# Patient Record
Sex: FEMALE | Race: WHITE
[De-identification: names, ages, dates, MRNs, and addresses within clinical notes are randomized per-mention and may not be internally consistent; named-entity substitution may affect disease eponyms.]

---

## 2018-08-09 ENCOUNTER — HOSPITAL ENCOUNTER (OUTPATIENT)
Dept: HOSPITAL 31 - C.ENDO | Age: 63
Discharge: HOME | End: 2018-08-09
Attending: INTERNAL MEDICINE
Payer: COMMERCIAL

## 2018-08-09 VITALS — RESPIRATION RATE: 18 BRPM | HEART RATE: 65 BPM | SYSTOLIC BLOOD PRESSURE: 112 MMHG | DIASTOLIC BLOOD PRESSURE: 58 MMHG

## 2018-08-09 VITALS — OXYGEN SATURATION: 96 %

## 2018-08-09 VITALS — TEMPERATURE: 97.1 F

## 2018-08-09 DIAGNOSIS — E11.9: ICD-10-CM

## 2018-08-09 DIAGNOSIS — K29.80: ICD-10-CM

## 2018-08-09 DIAGNOSIS — Z79.899: ICD-10-CM

## 2018-08-09 DIAGNOSIS — Z79.84: ICD-10-CM

## 2018-08-09 DIAGNOSIS — K64.0: ICD-10-CM

## 2018-08-09 DIAGNOSIS — Z12.11: Primary | ICD-10-CM

## 2018-08-09 DIAGNOSIS — K29.50: ICD-10-CM

## 2018-08-09 DIAGNOSIS — I10: ICD-10-CM

## 2018-08-09 DIAGNOSIS — Z79.82: ICD-10-CM

## 2018-08-09 DIAGNOSIS — R10.13: ICD-10-CM

## 2018-08-09 PROCEDURE — 43239 EGD BIOPSY SINGLE/MULTIPLE: CPT

## 2018-08-09 PROCEDURE — 88342 IMHCHEM/IMCYTCHM 1ST ANTB: CPT

## 2018-08-09 PROCEDURE — 88305 TISSUE EXAM BY PATHOLOGIST: CPT

## 2018-08-09 PROCEDURE — 88313 SPECIAL STAINS GROUP 2: CPT

## 2018-08-09 PROCEDURE — 45378 DIAGNOSTIC COLONOSCOPY: CPT

## 2018-08-09 PROCEDURE — 82948 REAGENT STRIP/BLOOD GLUCOSE: CPT

## 2019-03-28 ENCOUNTER — HOSPITAL ENCOUNTER (OUTPATIENT)
Dept: HOSPITAL 31 - C.ENDO | Age: 64
Discharge: HOME | End: 2019-03-28
Attending: INTERNAL MEDICINE
Payer: COMMERCIAL

## 2019-03-28 VITALS
TEMPERATURE: 97.2 F | SYSTOLIC BLOOD PRESSURE: 111 MMHG | DIASTOLIC BLOOD PRESSURE: 66 MMHG | HEART RATE: 69 BPM | OXYGEN SATURATION: 98 %

## 2019-03-28 VITALS — RESPIRATION RATE: 14 BRPM

## 2019-03-28 VITALS — BODY MASS INDEX: 23.8 KG/M2

## 2019-03-28 DIAGNOSIS — E11.9: ICD-10-CM

## 2019-03-28 DIAGNOSIS — E78.5: ICD-10-CM

## 2019-03-28 DIAGNOSIS — K64.0: ICD-10-CM

## 2019-03-28 DIAGNOSIS — Z12.11: Primary | ICD-10-CM

## 2019-03-28 PROCEDURE — 82948 REAGENT STRIP/BLOOD GLUCOSE: CPT

## 2019-03-28 NOTE — CP.SDSHP
[facility]





- History


Proposed Procedure: colonoscopy


Pre-Op Diagnosis: screening





- Allergies


Allergies: 


Allergies





amoxicillin [From Augmentin] Allergy (Verified 03/27/19 09:16)


   SWELLING


   rashes 


clavulanic acid [From Augmentin] Allergy (Verified 03/27/19 09:16)


   SWELLING


   rashes 











- Physical Exam


General Appearance: NAD


Vital Signs: 


                                   Vital Signs











  03/28/19





  09:24


 


Temperature 97.1 F L


 


Pulse Rate 88


 


Respiratory 17





Rate 


 


Blood Pressure 125/69


 


O2 Sat by Pulse 97





Oximetry 











Mental Status: Alert & Oriented x3


Neuro: WNL


Heart: WNL


Lungs: WNL


GI: WNL





- {Optional Preform as Required}


Abdomen: WNL





- Impression


Pt. Evaluated Today:Candidate for Anesthesia & Procedure: Yes





- Date & Time


Date: 03/28/19


Time: 10:43





Short Stay Discharge





- Short Stay Discharge


Admitting Diagnosis/Reason for Visit: PROCEDURE & TREATMENT NOT CARRIED OUT FOR 

OTHER RE


Disposition: HOME/ ROUTINE